# Patient Record
Sex: MALE | Race: WHITE | Employment: UNEMPLOYED | URBAN - METROPOLITAN AREA
[De-identification: names, ages, dates, MRNs, and addresses within clinical notes are randomized per-mention and may not be internally consistent; named-entity substitution may affect disease eponyms.]

---

## 2019-07-29 ENCOUNTER — APPOINTMENT (OUTPATIENT)
Dept: GENERAL RADIOLOGY | Age: 64
DRG: 301 | End: 2019-07-29
Attending: PHYSICIAN ASSISTANT
Payer: MEDICARE

## 2019-07-29 ENCOUNTER — HOSPITAL ENCOUNTER (INPATIENT)
Age: 64
LOS: 2 days | Discharge: HOME | DRG: 301 | End: 2019-07-31
Attending: EMERGENCY MEDICINE | Admitting: HOSPITALIST
Payer: MEDICARE

## 2019-07-29 DIAGNOSIS — M79.641 PAIN OF RIGHT HAND: ICD-10-CM

## 2019-07-29 DIAGNOSIS — L03.011 FELON OF FINGER OF RIGHT HAND: Primary | ICD-10-CM

## 2019-07-29 LAB
ANION GAP SERPL CALC-SCNC: 3 MMOL/L (ref 3–18)
BASOPHILS # BLD: 0 K/UL (ref 0–0.1)
BASOPHILS NFR BLD: 0 % (ref 0–2)
BUN SERPL-MCNC: 26 MG/DL (ref 7–18)
BUN/CREAT SERPL: 26 (ref 12–20)
CALCIUM SERPL-MCNC: 9.7 MG/DL (ref 8.5–10.1)
CHLORIDE SERPL-SCNC: 104 MMOL/L (ref 100–111)
CO2 SERPL-SCNC: 31 MMOL/L (ref 21–32)
CREAT SERPL-MCNC: 1.01 MG/DL (ref 0.6–1.3)
DIFFERENTIAL METHOD BLD: ABNORMAL
EOSINOPHIL # BLD: 0.4 K/UL (ref 0–0.4)
EOSINOPHIL NFR BLD: 4 % (ref 0–5)
ERYTHROCYTE [DISTWIDTH] IN BLOOD BY AUTOMATED COUNT: 13 % (ref 11.6–14.5)
EST. AVERAGE GLUCOSE BLD GHB EST-MCNC: 120 MG/DL
GLUCOSE SERPL-MCNC: 92 MG/DL (ref 74–99)
HBA1C MFR BLD: 5.8 % (ref 4.2–5.6)
HCT VFR BLD AUTO: 40.1 % (ref 36–48)
HGB BLD-MCNC: 12.9 G/DL (ref 13–16)
LYMPHOCYTES # BLD: 1.7 K/UL (ref 0.9–3.6)
LYMPHOCYTES NFR BLD: 18 % (ref 21–52)
MCH RBC QN AUTO: 28.5 PG (ref 24–34)
MCHC RBC AUTO-ENTMCNC: 32.2 G/DL (ref 31–37)
MCV RBC AUTO: 88.5 FL (ref 74–97)
MONOCYTES # BLD: 0.8 K/UL (ref 0.05–1.2)
MONOCYTES NFR BLD: 8 % (ref 3–10)
NEUTS SEG # BLD: 6.5 K/UL (ref 1.8–8)
NEUTS SEG NFR BLD: 70 % (ref 40–73)
PLATELET # BLD AUTO: 210 K/UL (ref 135–420)
PMV BLD AUTO: 10.8 FL (ref 9.2–11.8)
POTASSIUM SERPL-SCNC: 4.5 MMOL/L (ref 3.5–5.5)
RBC # BLD AUTO: 4.53 M/UL (ref 4.7–5.5)
SODIUM SERPL-SCNC: 138 MMOL/L (ref 136–145)
WBC # BLD AUTO: 9.4 K/UL (ref 4.6–13.2)

## 2019-07-29 PROCEDURE — 74011250637 HC RX REV CODE- 250/637: Performed by: PHYSICIAN ASSISTANT

## 2019-07-29 PROCEDURE — 99283 EMERGENCY DEPT VISIT LOW MDM: CPT

## 2019-07-29 PROCEDURE — 80048 BASIC METABOLIC PNL TOTAL CA: CPT

## 2019-07-29 PROCEDURE — 65270000029 HC RM PRIVATE

## 2019-07-29 PROCEDURE — 77030040361 HC SLV COMPR DVT MDII -B

## 2019-07-29 PROCEDURE — 83036 HEMOGLOBIN GLYCOSYLATED A1C: CPT

## 2019-07-29 PROCEDURE — 74011250636 HC RX REV CODE- 250/636: Performed by: HOSPITALIST

## 2019-07-29 PROCEDURE — 74011250636 HC RX REV CODE- 250/636: Performed by: PHYSICIAN ASSISTANT

## 2019-07-29 PROCEDURE — 85025 COMPLETE CBC W/AUTO DIFF WBC: CPT

## 2019-07-29 PROCEDURE — 74011250637 HC RX REV CODE- 250/637: Performed by: HOSPITALIST

## 2019-07-29 PROCEDURE — 74011000258 HC RX REV CODE- 258: Performed by: EMERGENCY MEDICINE

## 2019-07-29 PROCEDURE — 96365 THER/PROPH/DIAG IV INF INIT: CPT

## 2019-07-29 PROCEDURE — 73140 X-RAY EXAM OF FINGER(S): CPT

## 2019-07-29 PROCEDURE — 74011250636 HC RX REV CODE- 250/636: Performed by: EMERGENCY MEDICINE

## 2019-07-29 RX ORDER — HEPARIN SODIUM 5000 [USP'U]/ML
5000 INJECTION, SOLUTION INTRAVENOUS; SUBCUTANEOUS EVERY 8 HOURS
Status: DISCONTINUED | OUTPATIENT
Start: 2019-07-29 | End: 2019-07-30

## 2019-07-29 RX ORDER — VANCOMYCIN 2 GRAM/500 ML IN 0.9 % SODIUM CHLORIDE INTRAVENOUS
2000 EVERY 24 HOURS
Status: DISCONTINUED | OUTPATIENT
Start: 2019-07-29 | End: 2019-07-29

## 2019-07-29 RX ORDER — MORPHINE SULFATE 2 MG/ML
2 INJECTION, SOLUTION INTRAMUSCULAR; INTRAVENOUS
Status: DISCONTINUED | OUTPATIENT
Start: 2019-07-29 | End: 2019-07-31 | Stop reason: HOSPADM

## 2019-07-29 RX ORDER — HYDROCODONE BITARTRATE AND ACETAMINOPHEN 5; 325 MG/1; MG/1
1 TABLET ORAL ONCE
Status: COMPLETED | OUTPATIENT
Start: 2019-07-29 | End: 2019-07-29

## 2019-07-29 RX ORDER — HYDROCODONE BITARTRATE AND ACETAMINOPHEN 5; 325 MG/1; MG/1
1 TABLET ORAL
Status: DISCONTINUED | OUTPATIENT
Start: 2019-07-29 | End: 2019-07-31 | Stop reason: HOSPADM

## 2019-07-29 RX ORDER — ACETAMINOPHEN 325 MG/1
650 TABLET ORAL
Status: DISCONTINUED | OUTPATIENT
Start: 2019-07-29 | End: 2019-07-31 | Stop reason: HOSPADM

## 2019-07-29 RX ORDER — VANCOMYCIN 2 GRAM/500 ML IN 0.9 % SODIUM CHLORIDE INTRAVENOUS
2000 ONCE
Status: COMPLETED | OUTPATIENT
Start: 2019-07-29 | End: 2019-07-29

## 2019-07-29 RX ADMIN — VANCOMYCIN HYDROCHLORIDE 2000 MG: 10 INJECTION, POWDER, LYOPHILIZED, FOR SOLUTION INTRAVENOUS at 17:12

## 2019-07-29 RX ADMIN — HYDROCODONE BITARTRATE AND ACETAMINOPHEN 1 TABLET: 5; 325 TABLET ORAL at 21:35

## 2019-07-29 RX ADMIN — MORPHINE SULFATE 2 MG: 2 INJECTION, SOLUTION INTRAMUSCULAR; INTRAVENOUS at 19:50

## 2019-07-29 RX ADMIN — HEPARIN SODIUM 5000 UNITS: 5000 INJECTION, SOLUTION INTRAVENOUS; SUBCUTANEOUS at 21:35

## 2019-07-29 RX ADMIN — HYDROCODONE BITARTRATE AND ACETAMINOPHEN 1 TABLET: 5; 325 TABLET ORAL at 16:35

## 2019-07-29 RX ADMIN — AMPICILLIN AND SULBACTAM 1.5 G: 1; .5 INJECTION, POWDER, FOR SOLUTION INTRAMUSCULAR; INTRAVENOUS at 16:03

## 2019-07-29 NOTE — PROGRESS NOTES
Pharmacy Dosing Services: Vancomycin    Indication: Bone and Joint Infection    Day of therapy: 1    Other Antimicrobials (Include dose, start day & day of therapy):  -Ampicillin-Sulbactam 1.5 G IV once (in ED)      Loading dose (date given): 2000 mg  Current Maintenance dose: New Start    Goal Vancomycin Level: 15-20  (Trough 15-20 for most infections, 20 for meningitis/osteomyelitis, pre-HD level ~25)    Vancomycin Level (if drawn): Pending     Significant Cultures: Pending    Renal function stable? (unstable defined as SCr increase of 0.5 mg/dL or > 50% increase from baseline, whichever is greater) (Y/N): Y     CAPD, Hemodialysis or Renal Replacement Therapy (Y/N): N     Recent Labs     19  1550 19  1309   CREA 1.01  --    BUN 26*  --    WBC  --  9.4     Temp (24hrs), Av.3 °F (36.8 °C), Min:97.9 °F (36.6 °C), Max:98.7 °F (37.1 °C)    Creatinine Clearance (Creatinine Clearance (ml/min)): 76.3 mL/min     Regimen assessment: New Start  Maintenance dose: 1000 mg IV every 12 hours  Next scheduled level: Trough on  at 1630       Pharmacy will follow daily and adjust medications as appropriate for renal function and/or serum levels.     Thank you,  Chiquis Newton, PHARMD

## 2019-07-29 NOTE — ED NOTES
12:44 PM    Cha Sy is a 58 y.o. male presenting to the ED C/O right thumb pain and swelling x 3 days. Atraumatic. Seen at urgent care 3 days ago, rx'ed unknown medications, which he started yesterday. I performed a brief history of the patient here in triage and I have determined that pt will need further treatment and evaluation from the [facility] physician. I have placed initial orders to help in expediting patients care.          Visit Vitals  /76 (BP 1 Location: Right arm, BP Patient Position: At rest)   Pulse 79   Temp 97.9 °F (36.6 °C)   Resp 15   Ht 5' 10\" (1.778 m)   Wt 74.8 kg (165 lb)   SpO2 99%   BMI 23.68 kg/m²          Written by CLOVIS Dowling, ED

## 2019-07-29 NOTE — ED NOTES
Report to José Luis on [facility]. TRANSFER - ED to INPATIENT REPORT:    SBAR report made available to receiving floor on this patient being transferred to [address] [place] [address] ([facility])  for routine progression of care       Admitting diagnosis Cellulitis of thumb, right [L03.011]    Information from the following report(s) SBAR, ED Summary and MAR was made available to receiving floor. Lines:   Peripheral IV 07/29/19 Left Antecubital (Active)   Site Assessment Clean, dry, & intact 7/29/2019  3:50 PM   Phlebitis Assessment 0 7/29/2019  3:50 PM   Infiltration Assessment 0 7/29/2019  3:50 PM   Dressing Status Clean, dry, & intact 7/29/2019  3:50 PM   Hub Color/Line Status Green 7/29/2019  3:50 PM        Opportunity for questions and clarification was provided.       Patient is oriented to time, place, person and situation   Patient is  continent and ambulatory without assist     Valuables transported with patient     Patient transported with:   Tech      =Monitored (most recent)  Vitals w/ MEWS Score (last day)     Date/Time MEWS Score Pulse Resp Temp BP Level of Consciousness SpO2    07/29/19 17:14:44  1  (!) 58  18  98.7 °F (37.1 °C)  131/74  Alert  100 %    07/29/19 16:05:34    (!) 57  16  98.2 °F (36.8 °C)      100 %    07/29/19 1244  1  79  15  97.9 °F (36.6 °C)  126/76  Alert  99 %
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
## 2019-07-29 NOTE — ROUTINE PROCESS
Assume care of patient from Dolores Sheen, [place] (ED). Patient received in bed awake. Patient A&Ox4, denies pain and discomfort. No distress noted. Frequently use items within reach. Bed locked in low position. Call bell within reach and patient verbalized understanding of use for assistance and needs. Vancomycin currently infusing. 1926- Bedside and Verbal shift change report given to Truman Rodrigues RN (oncoming nurse) by Katy Walker RN (offgoing nurse). Report included the following information SBAR, Kardex, Intake/Output, MAR and Recent Results. 1950- PRN Morphine given for pain at a 11/10 per patient in right thumb. Patient has verbalized that Katina Never was ineffective in pain relief, oncoming nurse has been made aware and will follow up for reassessment.

## 2019-07-29 NOTE — ROUTINE PROCESS
Assumed care of pt report received from [address]. Pt awake, alert and oriented X 3. Right thumb swelling & redness & pain. No sign of distress noted. Bed in lowest position & wheels locked. Call bell within reach. 2135 -  Norco 1 tab given for Right thumb pain. 7/30/19  0102 -  Morphine 2 mg IV prn given for pain. 0500 - Morphine 2 mg IV prn given for pain. [address] 1 tab given for pain. 0730 -  Patient sleeping.    0755 - Bedside and Verbal shift change report given to DIANA Canseco (oncoming nurse) by Michelle Dominguez RN BSN ( off going Nurse ) inclusive of SBAR and plan of care, Intake & Output.

## 2019-07-29 NOTE — ED TRIAGE NOTES
Pt presents w/ R thumb pain x 3 days. Thumb is red, inflamed, swollen. Unsure how it happened. Seen at urgent care.

## 2019-07-30 ENCOUNTER — ANESTHESIA (OUTPATIENT)
Dept: SURGERY | Age: 64
DRG: 301 | End: 2019-07-30
Payer: MEDICARE

## 2019-07-30 ENCOUNTER — ANESTHESIA EVENT (OUTPATIENT)
Dept: SURGERY | Age: 64
DRG: 301 | End: 2019-07-30
Payer: MEDICARE

## 2019-07-30 LAB
ANION GAP SERPL CALC-SCNC: 7 MMOL/L (ref 3–18)
BASOPHILS # BLD: 0 K/UL (ref 0–0.1)
BASOPHILS NFR BLD: 0 % (ref 0–2)
BUN SERPL-MCNC: 18 MG/DL (ref 7–18)
BUN/CREAT SERPL: 22 (ref 12–20)
CALCIUM SERPL-MCNC: 8.8 MG/DL (ref 8.5–10.1)
CHLORIDE SERPL-SCNC: 106 MMOL/L (ref 100–111)
CO2 SERPL-SCNC: 26 MMOL/L (ref 21–32)
CREAT SERPL-MCNC: 0.83 MG/DL (ref 0.6–1.3)
DIFFERENTIAL METHOD BLD: ABNORMAL
EOSINOPHIL # BLD: 0.4 K/UL (ref 0–0.4)
EOSINOPHIL NFR BLD: 6 % (ref 0–5)
ERYTHROCYTE [DISTWIDTH] IN BLOOD BY AUTOMATED COUNT: 13 % (ref 11.6–14.5)
GLUCOSE SERPL-MCNC: 89 MG/DL (ref 74–99)
HCT VFR BLD AUTO: 36.2 % (ref 36–48)
HGB BLD-MCNC: 11.6 G/DL (ref 13–16)
LYMPHOCYTES # BLD: 1.6 K/UL (ref 0.9–3.6)
LYMPHOCYTES NFR BLD: 23 % (ref 21–52)
MAGNESIUM SERPL-MCNC: 1.9 MG/DL (ref 1.6–2.6)
MCH RBC QN AUTO: 28.1 PG (ref 24–34)
MCHC RBC AUTO-ENTMCNC: 32 G/DL (ref 31–37)
MCV RBC AUTO: 87.7 FL (ref 74–97)
MONOCYTES # BLD: 0.6 K/UL (ref 0.05–1.2)
MONOCYTES NFR BLD: 8 % (ref 3–10)
NEUTS SEG # BLD: 4.3 K/UL (ref 1.8–8)
NEUTS SEG NFR BLD: 63 % (ref 40–73)
PHOSPHATE SERPL-MCNC: 2.8 MG/DL (ref 2.5–4.9)
PLATELET # BLD AUTO: 197 K/UL (ref 135–420)
PMV BLD AUTO: 11.2 FL (ref 9.2–11.8)
POTASSIUM SERPL-SCNC: 4.2 MMOL/L (ref 3.5–5.5)
RBC # BLD AUTO: 4.13 M/UL (ref 4.7–5.5)
SODIUM SERPL-SCNC: 139 MMOL/L (ref 136–145)
WBC # BLD AUTO: 6.9 K/UL (ref 4.6–13.2)

## 2019-07-30 PROCEDURE — 87077 CULTURE AEROBIC IDENTIFY: CPT

## 2019-07-30 PROCEDURE — 84100 ASSAY OF PHOSPHORUS: CPT

## 2019-07-30 PROCEDURE — 74011250636 HC RX REV CODE- 250/636: Performed by: PHYSICIAN ASSISTANT

## 2019-07-30 PROCEDURE — 87185 SC STD ENZYME DETCJ PER NZM: CPT

## 2019-07-30 PROCEDURE — 3E10X8Z IRRIGATION OF SKIN AND MUCOUS MEMBRANES USING IRRIGATING SUBSTANCE: ICD-10-PCS | Performed by: PLASTIC SURGERY

## 2019-07-30 PROCEDURE — 77030018832 HC SOL IRR H20 ICUM -A: Performed by: PLASTIC SURGERY

## 2019-07-30 PROCEDURE — 76010000138 HC OR TIME 0.5 TO 1 HR: Performed by: PLASTIC SURGERY

## 2019-07-30 PROCEDURE — 74011250636 HC RX REV CODE- 250/636

## 2019-07-30 PROCEDURE — 87076 CULTURE ANAEROBE IDENT EACH: CPT

## 2019-07-30 PROCEDURE — 76060000032 HC ANESTHESIA 0.5 TO 1 HR: Performed by: PLASTIC SURGERY

## 2019-07-30 PROCEDURE — 85025 COMPLETE CBC W/AUTO DIFF WBC: CPT

## 2019-07-30 PROCEDURE — 77030013395: Performed by: PLASTIC SURGERY

## 2019-07-30 PROCEDURE — 83735 ASSAY OF MAGNESIUM: CPT

## 2019-07-30 PROCEDURE — 74011250637 HC RX REV CODE- 250/637: Performed by: HOSPITALIST

## 2019-07-30 PROCEDURE — 77030013189 HC SLV COMPR SCD HUNT -B: Performed by: PLASTIC SURGERY

## 2019-07-30 PROCEDURE — 80048 BASIC METABOLIC PNL TOTAL CA: CPT

## 2019-07-30 PROCEDURE — 74011000272 HC RX REV CODE- 272: Performed by: PLASTIC SURGERY

## 2019-07-30 PROCEDURE — 76210000006 HC OR PH I REC 0.5 TO 1 HR: Performed by: PLASTIC SURGERY

## 2019-07-30 PROCEDURE — 65270000029 HC RM PRIVATE

## 2019-07-30 PROCEDURE — 87186 SC STD MICRODIL/AGAR DIL: CPT

## 2019-07-30 PROCEDURE — 36415 COLL VENOUS BLD VENIPUNCTURE: CPT

## 2019-07-30 PROCEDURE — 74011000258 HC RX REV CODE- 258: Performed by: INTERNAL MEDICINE

## 2019-07-30 PROCEDURE — 87075 CULTR BACTERIA EXCEPT BLOOD: CPT

## 2019-07-30 PROCEDURE — 74011250636 HC RX REV CODE- 250/636: Performed by: INTERNAL MEDICINE

## 2019-07-30 PROCEDURE — 0HDQXZZ EXTRACTION OF FINGER NAIL, EXTERNAL APPROACH: ICD-10-PCS | Performed by: PLASTIC SURGERY

## 2019-07-30 PROCEDURE — 77030018836 HC SOL IRR NACL ICUM -A: Performed by: PLASTIC SURGERY

## 2019-07-30 PROCEDURE — 51798 US URINE CAPACITY MEASURE: CPT

## 2019-07-30 PROCEDURE — 87070 CULTURE OTHR SPECIMN AEROBIC: CPT

## 2019-07-30 PROCEDURE — 74011250636 HC RX REV CODE- 250/636: Performed by: HOSPITALIST

## 2019-07-30 RX ORDER — PROPOFOL 10 MG/ML
INJECTION, EMULSION INTRAVENOUS AS NEEDED
Status: DISCONTINUED | OUTPATIENT
Start: 2019-07-30 | End: 2019-07-30 | Stop reason: HOSPADM

## 2019-07-30 RX ORDER — MIDAZOLAM HYDROCHLORIDE 1 MG/ML
INJECTION, SOLUTION INTRAMUSCULAR; INTRAVENOUS AS NEEDED
Status: DISCONTINUED | OUTPATIENT
Start: 2019-07-30 | End: 2019-07-30 | Stop reason: HOSPADM

## 2019-07-30 RX ORDER — FENTANYL CITRATE 50 UG/ML
INJECTION, SOLUTION INTRAMUSCULAR; INTRAVENOUS AS NEEDED
Status: DISCONTINUED | OUTPATIENT
Start: 2019-07-30 | End: 2019-07-30 | Stop reason: HOSPADM

## 2019-07-30 RX ORDER — SODIUM CHLORIDE 0.9 % (FLUSH) 0.9 %
5-40 SYRINGE (ML) INJECTION AS NEEDED
Status: DISCONTINUED | OUTPATIENT
Start: 2019-07-30 | End: 2019-07-30 | Stop reason: HOSPADM

## 2019-07-30 RX ORDER — HYDROMORPHONE HYDROCHLORIDE 2 MG/ML
0.5 INJECTION, SOLUTION INTRAMUSCULAR; INTRAVENOUS; SUBCUTANEOUS AS NEEDED
Status: DISCONTINUED | OUTPATIENT
Start: 2019-07-30 | End: 2019-07-30 | Stop reason: HOSPADM

## 2019-07-30 RX ORDER — SODIUM CHLORIDE 0.9 % (FLUSH) 0.9 %
5-40 SYRINGE (ML) INJECTION EVERY 8 HOURS
Status: DISCONTINUED | OUTPATIENT
Start: 2019-07-30 | End: 2019-07-30 | Stop reason: HOSPADM

## 2019-07-30 RX ORDER — POLYETHYLENE GLYCOL 3350 17 G/17G
17 POWDER, FOR SOLUTION ORAL DAILY
Status: DISCONTINUED | OUTPATIENT
Start: 2019-07-30 | End: 2019-07-31 | Stop reason: HOSPADM

## 2019-07-30 RX ORDER — AMOXICILLIN 250 MG
1 CAPSULE ORAL DAILY
Status: DISCONTINUED | OUTPATIENT
Start: 2019-07-30 | End: 2019-07-31 | Stop reason: HOSPADM

## 2019-07-30 RX ORDER — HEPARIN SODIUM 5000 [USP'U]/ML
5000 INJECTION, SOLUTION INTRAVENOUS; SUBCUTANEOUS EVERY 8 HOURS
Status: DISCONTINUED | OUTPATIENT
Start: 2019-07-31 | End: 2019-07-31 | Stop reason: HOSPADM

## 2019-07-30 RX ORDER — SODIUM CHLORIDE, SODIUM LACTATE, POTASSIUM CHLORIDE, CALCIUM CHLORIDE 600; 310; 30; 20 MG/100ML; MG/100ML; MG/100ML; MG/100ML
INJECTION, SOLUTION INTRAVENOUS
Status: DISCONTINUED | OUTPATIENT
Start: 2019-07-30 | End: 2019-07-30 | Stop reason: HOSPADM

## 2019-07-30 RX ORDER — LIDOCAINE HYDROCHLORIDE 20 MG/ML
INJECTION, SOLUTION EPIDURAL; INFILTRATION; INTRACAUDAL; PERINEURAL AS NEEDED
Status: DISCONTINUED | OUTPATIENT
Start: 2019-07-30 | End: 2019-07-30 | Stop reason: HOSPADM

## 2019-07-30 RX ORDER — ONDANSETRON 2 MG/ML
4 INJECTION INTRAMUSCULAR; INTRAVENOUS ONCE
Status: DISCONTINUED | OUTPATIENT
Start: 2019-07-30 | End: 2019-07-30 | Stop reason: HOSPADM

## 2019-07-30 RX ORDER — ONDANSETRON HYDROCHLORIDE 4 MG/2ML
INJECTION, SOLUTION INTRAMUSCULAR; INTRAVENOUS AS NEEDED
Status: DISCONTINUED | OUTPATIENT
Start: 2019-07-30 | End: 2019-07-30 | Stop reason: HOSPADM

## 2019-07-30 RX ADMIN — MORPHINE SULFATE 2 MG: 2 INJECTION, SOLUTION INTRAMUSCULAR; INTRAVENOUS at 05:00

## 2019-07-30 RX ADMIN — MEROPENEM 1 G: 1 INJECTION, POWDER, FOR SOLUTION INTRAVENOUS at 21:19

## 2019-07-30 RX ADMIN — MIDAZOLAM HYDROCHLORIDE 2 MG: 1 INJECTION, SOLUTION INTRAMUSCULAR; INTRAVENOUS at 18:17

## 2019-07-30 RX ADMIN — MORPHINE SULFATE 2 MG: 2 INJECTION, SOLUTION INTRAMUSCULAR; INTRAVENOUS at 11:38

## 2019-07-30 RX ADMIN — ONDANSETRON HYDROCHLORIDE 4 MG: 4 INJECTION, SOLUTION INTRAMUSCULAR; INTRAVENOUS at 18:37

## 2019-07-30 RX ADMIN — FENTANYL CITRATE 100 MCG: 50 INJECTION, SOLUTION INTRAMUSCULAR; INTRAVENOUS at 18:27

## 2019-07-30 RX ADMIN — MORPHINE SULFATE 2 MG: 2 INJECTION, SOLUTION INTRAMUSCULAR; INTRAVENOUS at 01:02

## 2019-07-30 RX ADMIN — SODIUM CHLORIDE 1000 MG: 900 INJECTION, SOLUTION INTRAVENOUS at 04:40

## 2019-07-30 RX ADMIN — LIDOCAINE HYDROCHLORIDE 80 MG: 20 INJECTION, SOLUTION EPIDURAL; INFILTRATION; INTRACAUDAL; PERINEURAL at 18:27

## 2019-07-30 RX ADMIN — HYDROCODONE BITARTRATE AND ACETAMINOPHEN 1 TABLET: 5; 325 TABLET ORAL at 06:14

## 2019-07-30 RX ADMIN — MORPHINE SULFATE 2 MG: 2 INJECTION, SOLUTION INTRAMUSCULAR; INTRAVENOUS at 21:03

## 2019-07-30 RX ADMIN — MEROPENEM 1 G: 1 INJECTION, POWDER, FOR SOLUTION INTRAVENOUS at 13:28

## 2019-07-30 RX ADMIN — SODIUM CHLORIDE, SODIUM LACTATE, POTASSIUM CHLORIDE, CALCIUM CHLORIDE: 600; 310; 30; 20 INJECTION, SOLUTION INTRAVENOUS at 18:16

## 2019-07-30 RX ADMIN — PROPOFOL 200 MG: 10 INJECTION, EMULSION INTRAVENOUS at 18:27

## 2019-07-30 NOTE — ANESTHESIA PREPROCEDURE EVALUATION
Relevant Problems   No relevant active problems       Anesthetic History   No history of anesthetic complications            Review of Systems / Medical History  Patient summary reviewed and pertinent labs reviewed    Pulmonary    COPD: mild               Neuro/Psych   Within defined limits           Cardiovascular                  Exercise tolerance: >4 METS     GI/Hepatic/Renal  Within defined limits              Endo/Other  Within defined limits           Other Findings            Physical Exam    Airway  Mallampati: II  TM Distance: 4 - 6 cm    Mouth opening: Normal     Cardiovascular  Regular rate and rhythm,  S1 and S2 normal,  no murmur, click, rub, or gallop             Dental    Dentition: Full upper dentures     Pulmonary  Breath sounds clear to auscultation               Abdominal  GI exam deferred       Other Findings            Anesthetic Plan    ASA: 2  Anesthesia type: general          Induction: Intravenous  Anesthetic plan and risks discussed with: Patient

## 2019-07-30 NOTE — PROGRESS NOTES
Problem: Falls - Risk of  Goal: *Absence of Falls  Description  Document Jacey Tamayo Fall Risk and appropriate interventions in the flowsheet.   Outcome: Progressing Towards Goal  Note:   Fall Risk Interventions:            Medication Interventions: Assess postural VS orthostatic hypotension, Evaluate medications/consider consulting pharmacy, Patient to call before getting OOB, Teach patient to arise slowly                   Problem: Patient Education: Go to Patient Education Activity  Goal: Patient/Family Education  Outcome: Progressing Towards Goal     Problem: Cellulitis Care Plan (Adult)  Goal: *Control of acute pain  Outcome: Progressing Towards Goal  Goal: *Skin integrity maintained  Outcome: Progressing Towards Goal     Problem: Patient Education: Go to Patient Education Activity  Goal: Patient/Family Education  Outcome: Progressing Towards Goal     Problem: Pain  Goal: *Control of Pain  Outcome: Progressing Towards Goal

## 2019-07-30 NOTE — BRIEF OP NOTE
BRIEF OPERATIVE NOTE    Date of Procedure: 7/30/2019   Preoperative Diagnosis: infection of right thumb  Postoperative Diagnosis: infection of right thumb      Procedure(s):  Incision and Drainage of right thumb acute and severe paronychia    Surgeon(s) and Role:     Divine Ribeiro MD - Primary         Surgical Assistant: Amy Holder SA    Surgical Staff:  Circ-1: Deion Milner RN  Scrub Tech-1: [facility] Charm  Surg Asst-1: Leona Montero  Event Time In Time Out   Incision Start 07/30/2019 1839    Incision Close 07/30/2019 1844      Anesthesia: General   Estimated Blood Loss: none  Specimens:   ID Type Source Tests Collected by Time Destination   1 : right thumb abscess  Body Fluid Wound CULTURE, ANAEROBIC, CULTURE, BODY FLUID Divine Ribeiro MD 7/30/2019 1844 Microbiology      Findings: Central nail matrix erosion exposing cortical bone of distal phalanx  Complications: none  Implants: * No implants in log *
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
## 2019-07-30 NOTE — PROGRESS NOTES
conducted an initial consultation and Spiritual Assessment for Amber Cade, who is a 58 y.o.,male. Patients Primary Language is: [place]. According to the patients EMR Bahai Affiliation is: No preference. The reason the Patient came to the hospital is:   Patient Active Problem List    Diagnosis Date Noted    Cellulitis of thumb, right 07/29/2019        The  provided the following Interventions:  Initiated a relationship of care and support with patient in room 3023 this morning. Listened empathically as patient shared his story and frustrations about being here in the hospital with a thumb infection. He was hoping to get treated and go home but now he has been here nearly two days and only had iv fluids and antibiotics. Feels that it just needs to be looked at and lanced to let the infection out so that it will stop throbbing. Provided chaplaincy education. Provided information about Spiritual Care Services. Offered prayer and assurance of continued prayers on patients behalf. Chart reviewed. The following outcomes were achieved:  Patient shared limited information about his   medical narrative and spiritual journey/beliefs. Patient processed feeling about current hospitalization. Patient expressed gratitude for pastoral care visit. Assessment:  Patient does not have any Orthodoxy/cultural needs that will affect patients preferences in health care. There are no further spiritual or Orthodoxy issues which require Spiritual Care Services interventions at this time. Plan:  Chaplains will continue to follow and will provide pastoral care on an as needed/requested basis    . Janine Diallo   Spiritual Care   [phone]

## 2019-07-30 NOTE — PERIOP NOTES
TRANSFER - OUT REPORT:    Verbal report given to Sammi donato(name) on Carmelita Eller  being transferred to [facility](unit) for routine post - op       Report consisted of patients Situation, Background, Assessment and   Recommendations(SBAR). Information from the following report(s) SBAR, OR Summary, Procedure Summary, Intake/Output and MAR was reviewed with the receiving nurse. Lines:   Peripheral IV 07/29/19 Left Antecubital (Active)   Site Assessment Clean, dry, & intact 7/30/2019 11:32 AM   Phlebitis Assessment 0 7/30/2019 11:32 AM   Infiltration Assessment 0 7/30/2019 11:32 AM   Dressing Status Clean, dry, & intact 7/30/2019 11:32 AM   Dressing Type Tape;Transparent 7/30/2019 11:32 AM   Hub Color/Line Status Green;Flushed; Infusing 7/30/2019 11:32 AM   Action Taken Open ports on tubing capped 7/30/2019 11:32 AM   Alcohol Cap Used Yes 7/30/2019 11:32 AM        Opportunity for questions and clarification was provided.       Patient transported with:   [employer]
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
## 2019-07-30 NOTE — PROGRESS NOTES
Problem: Discharge Planning  Goal: *Discharge to safe environment  Outcome: Progressing Towards Goal    Home     Care Management Interventions  PCP Verified by CM: Yes  Palliative Care Criteria Met (RRAT>21 & CHF Dx)?: No  Transition of Care Consult (CM Consult): Discharge Planning  Discharge Durable Medical Equipment: (uses none)  Physical Therapy Consult: (independent)  Current Support Network: Lives Alone(Mother)  Confirm Follow Up Transport: Family  Plan discussed with Pt/Family/Caregiver: Yes  Discharge Location  Discharge Placement: Home     Reason for Admission:   RT tumb infection                   RRAT Score: 5                    Plan for utilizing home health: If indicated                       Current Advanced Directive/Advance Care Plan: not on file and not interested at this time                         Transition of Care Plan:     Spoke with patient in room, he stated that he lives alone and is independent with his care. He uses no DME's currently. He verified his address , insurance and phone # as correct on the facesheet. He stated that he hs no PCP and stated that \"doctor appointments are a waste of money\". Patient has designated ___mother___Tabitha Patton [phone]__________________ to participate in his/her discharge plan and to receive any needed information. He plans to return home upon discharge and transportation will be provided by family or the bus according to patient.

## 2019-07-30 NOTE — PROGRESS NOTES
Progress Note      Patient: Hector Pearson               Sex: male          DOA: 7/29/2019       YOB: 1955      Age:  58 y.o.        LOS:  LOS: 1 day             CHIEF COMPLAINT:  Thumb Pain      Assessment/Plan:     Principal Problem:    Cellulitis of thumb, right (7/29/2019)        PLAN:  - Thumb appears worse. D/w Dr. Keith La who said pt needs I&D asap. D/w Dr. Ann-Marie Cook and relayed pt will need stat I&D, she d/w Dr. Jason Miles, plan for surgery today at 5pm.  - Cont Iv vancomycin and meropenem started by ID  - Bowel regimen  - DVT ppx      Subjective:     Pt c/o thumb pain is worse. Also c/o difficulty urinating and constipation. Objective:     Visit Vitals  /89 (BP 1 Location: Left arm, BP Patient Position: At rest)   Pulse 62   Temp 98.2 °F (36.8 °C)   Resp 18   Ht 5' 10\" (1.778 m)   Wt 74.8 kg (165 lb)   SpO2 100%   BMI 23.68 kg/m²        Physical Exam:  Ears: hearing is intact  Eyes: Icterus is not present  Lungs: clear to auscultation bilaterally  Heart: regular rate and rhythm, S1, S2 normal, no murmur, click, rub or gallop  Gastrointestinal: soft, non-tender.  Bowel sounds normal. No masses,  no organomegaly  Neurological:  New Focal Deficits are not present  Ext: right thumb with increased erythema and edema see pictures in chart  Psychiatric:  Mood is stable    Lab/Data Reviewed:  CMP:   Lab Results   Component Value Date/Time     07/30/2019 06:03 AM    K 4.2 07/30/2019 06:03 AM     07/30/2019 06:03 AM    CO2 26 07/30/2019 06:03 AM    AGAP 7 07/30/2019 06:03 AM    GLU 89 07/30/2019 06:03 AM    BUN 18 07/30/2019 06:03 AM    CREA 0.83 07/30/2019 06:03 AM    GFRAA >60 07/30/2019 06:03 AM    GFRNA >60 07/30/2019 06:03 AM    CA 8.8 07/30/2019 06:03 AM    MG 1.9 07/30/2019 06:03 AM    PHOS 2.8 07/30/2019 06:03 AM     CBC:   Lab Results   Component Value Date/Time    WBC 6.9 07/30/2019 06:03 AM    HGB 11.6 (L) 07/30/2019 06:03 AM    HCT 36.2 07/30/2019 06:03 AM     07/30/2019 06:03 AM       Imaging Reviewed:  No results found.

## 2019-07-30 NOTE — PROGRESS NOTES
Assumed care from Faye Jones [facility]. Pt resting in bed with no signs of pain or distress. Bed locked and in lowest position with call bell in reach. Finger open to air. 1552 Pt was given preop bath. Pt refused to take off jewelery and clothes. He was educated on the importance and stated he'll deal with it later.

## 2019-07-31 VITALS
HEIGHT: 70 IN | HEART RATE: 68 BPM | BODY MASS INDEX: 23.62 KG/M2 | TEMPERATURE: 98.3 F | OXYGEN SATURATION: 98 % | SYSTOLIC BLOOD PRESSURE: 120 MMHG | DIASTOLIC BLOOD PRESSURE: 77 MMHG | WEIGHT: 165 LBS | RESPIRATION RATE: 15 BRPM

## 2019-07-31 LAB
ANION GAP SERPL CALC-SCNC: 5 MMOL/L (ref 3–18)
BASOPHILS # BLD: 0 K/UL (ref 0–0.1)
BASOPHILS NFR BLD: 0 % (ref 0–2)
BUN SERPL-MCNC: 17 MG/DL (ref 7–18)
BUN/CREAT SERPL: 18 (ref 12–20)
CALCIUM SERPL-MCNC: 8.8 MG/DL (ref 8.5–10.1)
CHLORIDE SERPL-SCNC: 106 MMOL/L (ref 100–111)
CO2 SERPL-SCNC: 28 MMOL/L (ref 21–32)
CREAT SERPL-MCNC: 0.94 MG/DL (ref 0.6–1.3)
DIFFERENTIAL METHOD BLD: ABNORMAL
EOSINOPHIL # BLD: 0.4 K/UL (ref 0–0.4)
EOSINOPHIL NFR BLD: 6 % (ref 0–5)
ERYTHROCYTE [DISTWIDTH] IN BLOOD BY AUTOMATED COUNT: 13 % (ref 11.6–14.5)
GLUCOSE SERPL-MCNC: 87 MG/DL (ref 74–99)
HCT VFR BLD AUTO: 38.4 % (ref 36–48)
HGB BLD-MCNC: 12.3 G/DL (ref 13–16)
LYMPHOCYTES # BLD: 1.2 K/UL (ref 0.9–3.6)
LYMPHOCYTES NFR BLD: 21 % (ref 21–52)
MAGNESIUM SERPL-MCNC: 1.8 MG/DL (ref 1.6–2.6)
MCH RBC QN AUTO: 28.2 PG (ref 24–34)
MCHC RBC AUTO-ENTMCNC: 32 G/DL (ref 31–37)
MCV RBC AUTO: 88.1 FL (ref 74–97)
MONOCYTES # BLD: 0.7 K/UL (ref 0.05–1.2)
MONOCYTES NFR BLD: 12 % (ref 3–10)
NEUTS SEG # BLD: 3.6 K/UL (ref 1.8–8)
NEUTS SEG NFR BLD: 61 % (ref 40–73)
PHOSPHATE SERPL-MCNC: 3.5 MG/DL (ref 2.5–4.9)
PLATELET # BLD AUTO: 206 K/UL (ref 135–420)
PMV BLD AUTO: 11 FL (ref 9.2–11.8)
POTASSIUM SERPL-SCNC: 4.5 MMOL/L (ref 3.5–5.5)
RBC # BLD AUTO: 4.36 M/UL (ref 4.7–5.5)
SODIUM SERPL-SCNC: 139 MMOL/L (ref 136–145)
WBC # BLD AUTO: 5.8 K/UL (ref 4.6–13.2)

## 2019-07-31 PROCEDURE — 74011250636 HC RX REV CODE- 250/636: Performed by: INTERNAL MEDICINE

## 2019-07-31 PROCEDURE — 84100 ASSAY OF PHOSPHORUS: CPT

## 2019-07-31 PROCEDURE — 74011000258 HC RX REV CODE- 258: Performed by: INTERNAL MEDICINE

## 2019-07-31 PROCEDURE — 85025 COMPLETE CBC W/AUTO DIFF WBC: CPT

## 2019-07-31 PROCEDURE — 36415 COLL VENOUS BLD VENIPUNCTURE: CPT

## 2019-07-31 PROCEDURE — 83735 ASSAY OF MAGNESIUM: CPT

## 2019-07-31 PROCEDURE — 74011250636 HC RX REV CODE- 250/636: Performed by: PHYSICIAN ASSISTANT

## 2019-07-31 PROCEDURE — 74011250636 HC RX REV CODE- 250/636: Performed by: HOSPITALIST

## 2019-07-31 PROCEDURE — 80048 BASIC METABOLIC PNL TOTAL CA: CPT

## 2019-07-31 RX ORDER — AMOXICILLIN AND CLAVULANATE POTASSIUM 875; 125 MG/1; MG/1
1 TABLET, FILM COATED ORAL EVERY 12 HOURS
Qty: 120 TAB | Refills: 0 | Status: SHIPPED | OUTPATIENT
Start: 2019-07-31 | End: 2019-07-31 | Stop reason: SDUPTHER

## 2019-07-31 RX ORDER — LEVOFLOXACIN 750 MG/1
750 TABLET ORAL DAILY
Qty: 10 TAB | Refills: 0 | Status: SHIPPED | OUTPATIENT
Start: 2019-07-31 | End: 2019-08-10

## 2019-07-31 RX ORDER — AMOXICILLIN AND CLAVULANATE POTASSIUM 875; 125 MG/1; MG/1
1 TABLET, FILM COATED ORAL EVERY 12 HOURS
Qty: 20 TAB | Refills: 0 | Status: SHIPPED | OUTPATIENT
Start: 2019-07-31 | End: 2019-08-10

## 2019-07-31 RX ORDER — LEVOFLOXACIN 750 MG/1
750 TABLET ORAL DAILY
Qty: 60 TAB | Refills: 0 | Status: SHIPPED | OUTPATIENT
Start: 2019-07-31 | End: 2019-07-31 | Stop reason: SDUPTHER

## 2019-07-31 RX ADMIN — HEPARIN SODIUM 5000 UNITS: 5000 INJECTION, SOLUTION INTRAVENOUS; SUBCUTANEOUS at 14:00

## 2019-07-31 RX ADMIN — MORPHINE SULFATE 2 MG: 2 INJECTION, SOLUTION INTRAMUSCULAR; INTRAVENOUS at 08:15

## 2019-07-31 RX ADMIN — MEROPENEM 1 G: 1 INJECTION, POWDER, FOR SOLUTION INTRAVENOUS at 06:24

## 2019-07-31 RX ADMIN — SODIUM CHLORIDE 1000 MG: 900 INJECTION, SOLUTION INTRAVENOUS at 05:10

## 2019-07-31 RX ADMIN — MEROPENEM 1 G: 1 INJECTION, POWDER, FOR SOLUTION INTRAVENOUS at 13:59

## 2019-07-31 NOTE — PROGRESS NOTES
Problem: Falls - Risk of  Goal: *Absence of Falls  Description  Document Lenin De Paz Fall Risk and appropriate interventions in the flowsheet.   Outcome: Progressing Towards Goal  Note:   Fall Risk Interventions:            Medication Interventions: Assess postural VS orthostatic hypotension, Evaluate medications/consider consulting pharmacy, Patient to call before getting OOB, Teach patient to arise slowly                   Problem: Patient Education: Go to Patient Education Activity  Goal: Patient/Family Education  Outcome: Progressing Towards Goal     Problem: Cellulitis Care Plan (Adult)  Goal: *Control of acute pain  Outcome: Progressing Towards Goal  Goal: *Skin integrity maintained  Outcome: Progressing Towards Goal  Goal: *Absence of infection signs and symptoms  Outcome: Progressing Towards Goal     Problem: Pain  Goal: *Control of Pain  Outcome: Progressing Towards Goal

## 2019-07-31 NOTE — PROGRESS NOTES
Assumed care from [facility]. Pt resting in bed with no signs of pain or distress. Bed locked and in lowest position with call bell in reach. Finger C/D/I.

## 2019-07-31 NOTE — DISCHARGE INSTRUCTIONS
DISCHARGE SUMMARY from Nurse    PATIENT INSTRUCTIONS:    After general anesthesia or intravenous sedation, for 24 hours or while taking prescription Narcotics:  · Limit your activities  · Do not drive and operate hazardous machinery  · Do not make important personal or business decisions  · Do  not drink alcoholic beverages  · If you have not urinated within 8 hours after discharge, please contact your surgeon on call. Report the following to your surgeon:  · Excessive pain, swelling, redness or odor of or around the surgical area  · Temperature over 100.5  · Nausea and vomiting lasting longer than 4 hours or if unable to take medications  · Any signs of decreased circulation or nerve impairment to extremity: change in color, persistent  numbness, tingling, coldness or increase pain  · Any questions    What to do at Home:  Recommended activity: Activity as tolerated    If you experience any of the following symptoms yellow/green discharge from finger, fever or chills, please follow up with primary care physician/urgent care/emergency room. *  Please give a list of your current medications to your Primary Care Provider. *  Please update this list whenever your medications are discontinued, doses are      changed, or new medications (including over-the-counter products) are added. *  Please carry medication information at all times in case of emergency situations. These are general instructions for a healthy lifestyle:    No smoking/ No tobacco products/ Avoid exposure to second hand smoke  Surgeon General's Warning:  Quitting smoking now greatly reduces serious risk to your health.     Obesity, smoking, and sedentary lifestyle greatly increases your risk for illness    A healthy diet, regular physical exercise & weight monitoring are important for maintaining a healthy lifestyle    You may be retaining fluid if you have a history of heart failure or if you experience any of the following symptoms: Weight gain of 3 pounds or more overnight or 5 pounds in a week, increased swelling in our hands or feet or shortness of breath while lying flat in bed. Please call your doctor as soon as you notice any of these symptoms; do not wait until your next office visit. The discharge information has been reviewed with the patient. The patient verbalized understanding. Discharge medications reviewed with the patient and appropriate educational materials and side effects teaching were provided. Patient armband removed and shredded.

## 2019-07-31 NOTE — PROGRESS NOTES
Discharge instructions reviewed with the pt on behalf of primary nurse Mary Wiley. Dressing change to right thumb performed by Mary Wiley. Pt received prescriptions for levaquin and augmentin from the [place] [id number].. Offered to have case management try to schedule a PCP appointment, but pt declined stating 'I'm not going to see no doctor\". Pt escorted to lobby to be driven by family member. Pt left in stable condition.

## 2019-07-31 NOTE — PROGRESS NOTES
Progress Note      Patient: Jignesh Sanchez               Sex: male          DOA: 7/29/2019       YOB: 1955      Age:  58 y.o.        LOS:  LOS: 2 days             CHIEF COMPLAINT:  Thumb Pain      Assessment/Plan:     Principal Problem:    Cellulitis of thumb, right (7/29/2019)        PLAN:  - s/p I&D on 7/30 by Dr. Will Diego. Cultures show GPC in pairs and rare GNRs. Await speciation.   - Cont IV vancomycin and meropenem started by ID  - Bowel regimen  - DVT ppx      Subjective:     Pt c/o being in the hospital. He states he will leave later today. Objective:     Visit Vitals  /61   Pulse 83   Temp 98.6 °F (37 °C)   Resp 18   Ht 5' 10\" (1.778 m)   Wt 74.8 kg (165 lb)   SpO2 97%   BMI 23.68 kg/m²        Physical Exam:  Ears: hearing is intact  Eyes: Icterus is not present  Lungs: clear to auscultation bilaterally  Heart: regular rate and rhythm, S1, S2 normal, no murmur, click, rub or gallop  Gastrointestinal: soft, non-tender. Bowel sounds normal. No masses,  no organomegaly  Neurological:  New Focal Deficits are not present  Ext: right thumb s/p ID in dressing  Psychiatric:  Mood is stable    Lab/Data Reviewed:  CMP:   Lab Results   Component Value Date/Time     07/31/2019 05:53 AM    K 4.5 07/31/2019 05:53 AM     07/31/2019 05:53 AM    CO2 28 07/31/2019 05:53 AM    AGAP 5 07/31/2019 05:53 AM    GLU 87 07/31/2019 05:53 AM    BUN 17 07/31/2019 05:53 AM    CREA 0.94 07/31/2019 05:53 AM    GFRAA >60 07/31/2019 05:53 AM    GFRNA >60 07/31/2019 05:53 AM    CA 8.8 07/31/2019 05:53 AM    MG 1.8 07/31/2019 05:53 AM    PHOS 3.5 07/31/2019 05:53 AM     CBC:   Lab Results   Component Value Date/Time    WBC 5.8 07/31/2019 05:53 AM    HGB 12.3 (L) 07/31/2019 05:53 AM    HCT 38.4 07/31/2019 05:53 AM     07/31/2019 05:53 AM       Imaging Reviewed:  No results found.
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
## 2019-07-31 NOTE — ROUTINE PROCESS
1930 -   Report received from Melyss[facility]. Patient off unit. 1934 -   Phone report received from [address] [place] [address] -  Patient came back to unit. Received pt from PACU via bed. Pt awake, alert and oriented X 4. Right thumb has xeroform dressing noted small bloody drainage. Encouraged pt to elevate right hand with pillow. No verbal distress noted. Bed in lowest position & wheels locked. Call bell within reach. Pt said ' I'm hungry\". Will continue to monitor. 2030 -  Dinner meal gave to patient. Ate 100%. No nausea. 2103  -  Morphine 2 mg IV prn given for Right hand pain.

## 2019-08-03 LAB
BACTERIA SPEC CULT: ABNORMAL
GRAM STN SPEC: ABNORMAL
SERVICE CMNT-IMP: ABNORMAL

## 2019-08-04 LAB
BACTERIA SPEC CULT: ABNORMAL
SERVICE CMNT-IMP: ABNORMAL

## 2019-08-07 NOTE — OP NOTES
[facility]  OPERATIVE REPORT    Name:  Obi Fuller  MR#:   [id number]  :  1955  ACCOUNT #:  [de-identified]  DATE OF SERVICE:  2019    PREOPERATIVE DIAGNOSIS:  Infection, right thumb. POSTOPERATIVE DIAGNOSIS:  Right thumb paronychia without extension. PROCEDURE PERFORMED:  Incision and drainage of right thumb acute and severe paronychia. SURGEON:  Génesis Mcnamara MD    ASSISTANT:  Jesika Walls SA    ANESTHESIA:  General.    COMPLICATIONS:  None. SPECIMENS REMOVED:  Culture, aerobic and anaerobic from paronychia. IMPLANTS:  None. ESTIMATED BLOOD LOSS:  None. FINDINGS:  Central nail matrix erosion exposing cortical bone of distal phalanx. PROCEDURE:  The patient was placed on the operating table in supine position. The right hand was placed on an arm board and a middle upper arm tourniquet was placed on the patient above the level of the drapes. The Demarcus stockings were inflated prior to the induction of anesthesia. The right hand was prepped and draped and exsanguinated using the Esmarch bandage before the middle upper arm tourniquet was inflated to 250 mmHg pressure. The right thumb nail plate was gently removed and the paronychia was confined only to the space beneath the nail plate. There was no extension into the palmar surface making it a felon and there was no involvement of the flexor tendon apparatus. The wound was slowly lavaged with dilute hydrogen peroxide, and after cultures were taken for aerobic and anaerobic organisms, the nail plate was discarded. There was a central erosion of the sterile matrix exposing uninfected cortical bone. The defect was very small and was then covered with Xeroform packing and the tourniquet was released. The thumb was wrapped with 4x4s in a non-compressive circumferential fashion and held in place with Kerlix bandage.   This permitted the patient to be returned to his bed in the hospital of the recovery room for the completion of his intravenous antibiotic therapy and ultimately to be discharged home and followed on an outpatient basis.       Sachi Irving MD      [id number]  D:  08/06/2019 20:49  T:  08/06/2019 22:37  JOB #:  [id number]

## 2021-02-19 ENCOUNTER — HOSPITAL ENCOUNTER (EMERGENCY)
Age: 66
Discharge: HOME | End: 2021-02-19
Attending: EMERGENCY MEDICINE
Payer: MEDICARE

## 2021-02-19 VITALS
TEMPERATURE: 98.2 F | SYSTOLIC BLOOD PRESSURE: 138 MMHG | HEART RATE: 67 BPM | DIASTOLIC BLOOD PRESSURE: 76 MMHG | BODY MASS INDEX: 23.1 KG/M2 | HEIGHT: 71 IN | RESPIRATION RATE: 16 BRPM | WEIGHT: 165 LBS | OXYGEN SATURATION: 99 %

## 2021-02-19 DIAGNOSIS — R22.41 MASS OF LEG, RIGHT: ICD-10-CM

## 2021-02-19 DIAGNOSIS — L60.9 FINGERNAIL PROBLEM: Primary | ICD-10-CM

## 2021-02-19 PROCEDURE — 99282 EMERGENCY DEPT VISIT SF MDM: CPT
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
## 2021-02-19 NOTE — ED TRIAGE NOTES
Patient presents to the ED with a cyst in his R thigh, states it has been there for years, but recently became bigger, no drainage
